# Patient Record
(demographics unavailable — no encounter records)

---

## 2024-10-23 NOTE — PHYSICAL EXAM
[Alert] : alert [Normal Voice/Communication] : normal voice/communication [Healthy Appearing] : healthy appearing [No Acute Distress] : no acute distress [Sclera] : the sclera and conjunctiva were normal [Hearing Threshold Finger Rub Not [place]] : hearing was normal [Normal Lips/Gums] : the lips and gums were normal [Oropharynx] : the oropharynx was normal [Normal Appearance] : the appearance of the neck was normal [No Neck Mass] : no neck mass was observed [No Respiratory Distress] : no respiratory distress [No Acc Muscle Use] : no accessory muscle use [Respiration, Rhythm And Depth] : normal respiratory rhythm and effort [Auscultation Breath Sounds / Voice Sounds] : lungs were clear to auscultation bilaterally [Heart Rate And Rhythm] : heart rate was normal and rhythm regular [Normal S1, S2] : normal S1 and S2 [Murmurs] : no murmurs [Bowel Sounds] : normal bowel sounds [Abdomen Tenderness] : non-tender [No Masses] : no abdominal mass palpated [Abdomen Soft] : soft [] : no hepatosplenomegaly [Oriented To Time, Place, And Person] : oriented to person, place, and time

## 2024-10-23 NOTE — ASSESSMENT
[FreeTextEntry1] : Lost 5 lbs since august, 8 lbs since June. Down 10 lbs since March;  CT chest abd pelvis from this year essentially normal. Previously under the care of Nicole Barth MD with 1 cm hiatal hernia and shatzki ring. last colonoscopy approx 10 years ago with Dr. RD Morrell. Pt had 2 ANIL 05/24. States he is eating less carbs since his cath and thinks this may be the cause of his weight. CBC, cmp normal.  IMP: 1. Weight loss, with recent weight gain-- ? voluntary- pt has been under stress with ill wife 2. Hx of shatzki ring, hh 3. CAD  PLAN: 1 pt encouraged to increase intake 2. RTO 4 -6 weeks for reevaluation 3. if continued weight loss, will consider colonoscopy, although doubt would change mgmt

## 2024-10-23 NOTE — HISTORY OF PRESENT ILLNESS
[FreeTextEntry1] : Lost 5 lbs since august, 8 lbs since June. Down 10 lbs since March;  CT chest abd pelvis from this year essentially normal. Previously under the care of Nicole Barth MD with 1 cm hiatal hernia and shatzki ring. last colonoscopy approx 10 years ago with Dr. RD Morrell. Pt had 2 ANIL 05/24. States he is eating less carbs since his cath and thinks this may be the cause of his weight. CBC, cmp normal.

## 2024-11-20 NOTE — PHYSICAL EXAM

## 2024-11-20 NOTE — HISTORY OF PRESENT ILLNESS
[FreeTextEntry1] : This is an 83 y/o male with a pmhx of CAD s/p stent to RCA X 2 in 05/2024, HLD, HTN, AS, esophageal dysmotility here today for follow up. Patient was seen by Dr. Dee for KURTZ. s/p normal ddimer, normal PFT's No obvious pulmonary causes found for his exertional dyspnea. Patient states that KURTZ has resolved at this time. Patient states that he is feeling well today with no complaints   Denies chest pain, palpitations, diaphoresis, vision changes, HA, dizziness, syncope, cough, wheezing, edema, fever, chills, infection.

## 2025-02-13 NOTE — HISTORY OF PRESENT ILLNESS
[de-identified] : He has symptoms for three days- cough which is mostly dry, chest pain and throat pain with cough. No sinus pain, ear pain, dyspnea, fever.  No OTC meds.  His granddaughter has strep throat.  COVID-19 testing negative X2.  He hasn't had the flu vaccine or current COVID-19 vaccine.

## 2025-02-13 NOTE — ASSESSMENT
[FreeTextEntry1] : URI, possible early bronchitis.  Will treat with a Z-jona.  OTC meds discussed.  Rapid strep negative (had been exposed).  Will check a nasal swab for COVID-19, influenza and RSV.

## 2025-02-28 NOTE — PHYSICAL EXAM
[FreeTextEntry3] : PE:  General: well-appearing, alert, in no acute distress  Full body skin exam performed examining scalp, head, face, ears, eyes, mouth, neck, chest, back, abdomen, axilla, buttock, b/l arms, b/l forearms, b/l hands, b/l fingernails, b/l thighs, b/l legs, b/l feet, b/l toenails. Groin and genitalia deferred per patient. Pertinent findings include: - scattered gritty papules on the face, scalp, arms - On left medial frontal scalp there is a ~1cm pearly papule with central ulceration - On the left lateral frontal scalp there is a pink pearly papule with central crusting  - chest with pink papule  - fairly uniform and regular brown macules and papules on the trunk and extremities. dermoscopy with benign features - cherry red papules scattered on torso

## 2025-02-28 NOTE — ASSESSMENT
[FreeTextEntry1] :  #Neoplasm of Uncertain Behavior x 3 Site A: Left medial frontal scalp 11.5cm to left medial eyebrow and 16cm to anterior helix Ddx: BCC  Site B: Left lateral frontal scalp 9.7cm to left medial eyebrow 12.5cm to anterior helix Ddx: BCC  Site C: central chest Ddx: BCC vs. seb derm/eczematous derm   Shave Biopsy x3 The indication, benefits, alternatives, and all side effects including pain, bleeding, infection, scar, recurrence, nerve damage were discussed. Informed consent was obtained in writing. The lesions on the above locations were prepped with alcohol and locally anesthetized with 0.1mL of 1% lidocaine with epinephrine. The specimen was removed by tangential shave using a Dermablade. Hemostasis was achieved with pressure and aluminum chloride/drysol. A sterile dressing with Petrolatum ointment was applied to the wound. Verbal and written wound care instructions were given. The specimens were labeled and submitted to pathology for histological evaluation. The patient will be notified of their biopsy results within the next two weeks and appropriate treatment and follow-up recommendations will be made at that time. The procedure was well tolerated without complications..  #Seborrheic keratoses  #Lentigines  #Cherry angioma  #Multiple melanocytic nevi, benign - education, counseling, reassurance - ABCDEs of melanoma reviewed, rtc sooner if changing  #History of multiple basal cell carcinoma  - 3 bx today as below - Recommend follow up in high risk skin cancer clinic - Recommend q6 month skin checks at least  # actinic keratosis - education, counseling - after discussion of r/b/a, verbal consent obtained and treated with cryotherapy as below: --Cryotherapy performed: Benefits/Risks/adverse effects discussed: erythema, blistering, dyspigmentation (hypo/hyper), scar, need for multiple treatment, persistence/recurrence. Lesion number: 10 #freeze-thaw cycles to each lesion: 2 Thaw time: 5s Wound care discussed  FBSE/Healthcare maintenance -Sun protection was discussed. The proper use of broad-spectrum UVA/UVB sunscreens with SPF 30 or greater was reviewed and the need for re-application after swimming or sweating or 2-3 hours was emphasized. We talked about judicious use of clothing and avoidance of peak periods of sun exposure. I made the patient aware of the need for year-round protection. ABCDEs of melanoma were also reviewed. -Self-skin checks were also reviewed, rtc sooner if changed noted -Counseled patient to monitor for changes - FBSE completed today, no lesions concerning for malignancy. Next FBSE due 6 months  RTC PRN path/ 6 months for FBSE

## 2025-02-28 NOTE — HISTORY OF PRESENT ILLNESS
[FreeTextEntry1] : NPV: fbse [de-identified] : BRANDY LANG is a 84-year-old who is presenting for evaluation of:   1. FBSE- has history of >12 BCCs s/p Mohs, ED&C, efudex (most recent 5 years ago). Last BCC was last year. Previously followed at [facility].   Personal skin cancer hx: > 12 BCCs - scalp, face, back, hand s/p Mohs, ED&C, efudex

## 2025-03-28 NOTE — HEALTH RISK ASSESSMENT
[No falls in past year] : Patient reported no falls in the past year [0] : 1) Little interest or pleasure doing things: Not at all (0) [Patient/Caregiver not ready to engage] : , patient/caregiver not ready to engage [[id number]] : 0 [Change in mental status noted] : No change in mental status noted [Language] : denies difficulty with language [Behavior] : denies difficulty with behavior [Learning/Retaining New Information] : denies difficulty learning/retaining new information [Handling Complex Tasks] : denies difficulty handling complex tasks [Reasoning] : denies difficulty with reasoning [Spatial Ability and Orientation] : denies difficulty with spatial ability and orientation

## 2025-03-28 NOTE — HISTORY OF PRESENT ILLNESS
[FreeTextEntry1] : The patient is here for a routine visit.  [de-identified] : He is active and walks on the treadmill.  No chest pain or dyspnea.   Diet is healthy.  Appetite now good and weight is stable. No abdominal pain.  He has chronic esophageal symptoms.

## 2025-03-28 NOTE — ASSESSMENT
[FreeTextEntry1] : Medical events reviewed.  He had symptoms last spring and had a cardiac cath with the placement of two coronary stents.  He then had a second cath for persistent symptoms and he says that was patent.  He sees his cardiologist, Dr. Fraire.  He has no symptoms now and he feels well.  He will see his cardiologist next week.  Discussed diet and exercise.  Check lipids on Repatha.  Check a HgBA1c which was 5.9.  The BP is good at 125/60.  Note he is on Amlodipine.  He has skin cancer and he sees his dermatologist.  Shingrix advised and declined.  S/p Prevnar 20.  He had the original COVID-19 vaccine and was hospitalized with a severe reaction so he defers the current vaccine.  Esophageal symptoms chronic.  Weight is stable and appetite good.  He had previous weight loss last year and had a negative evaluation including CT A/P. He sees a gastroenterologist.    He saw a pulmonologist last year for dyspnea.  Symptoms resolved.  He sees an ophthalmologist.

## 2025-03-28 NOTE — HEALTH RISK ASSESSMENT
[No falls in past year] : Patient reported no falls in the past year [0] : 1) Little interest or pleasure doing things: Not at all (0) [Patient/Caregiver not ready to engage] : , patient/caregiver not ready to engage [[id number]] : 0 [Change in mental status noted] : No change in mental status noted [Language] : denies difficulty with language [Behavior] : denies difficulty with behavior [Learning/Retaining New Information] : denies difficulty learning/retaining new information [Handling Complex Tasks] : denies difficulty handling complex tasks [Reasoning] : denies difficulty with reasoning [Spatial Ability and Orientation] : denies difficulty with spatial ability and orientation

## 2025-03-28 NOTE — HISTORY OF PRESENT ILLNESS
[FreeTextEntry1] : The patient is here for a routine visit.  [de-identified] : He is active and walks on the treadmill.  No chest pain or dyspnea.   Diet is healthy.  Appetite now good and weight is stable. No abdominal pain.  He has chronic esophageal symptoms.

## 2025-04-02 NOTE — HISTORY OF PRESENT ILLNESS
[FreeTextEntry1] : This is an 85 y/o male with a pmhx of CAD s/p stent to RCA X 2 in 05/2024, HLD, HTN, AS, esophageal dysmotility here today for follow up. Patient s/p recent ED visit with chills, body tremors, chest pain, SOB.  Patient was noted to be hypotensive and labs with leukocytosis. wbc 22. CT chest AP w/ no identified source of infection UA negative , RBVP negative , s/p 2L IV fluid bolus w/ mild improvement in BP , s/p Ceftriaxone. No identifiable source of infection. Blood culture, NTD. WBC count, now wnl. ekg NSR w/o acute changes, trop neg x 2. tte with normal biventricular function and mild AS. Had SOB mostly with exertion, CXR and CT chest w/ no findings , o2 sat 95% on ra , no respiratory distress , chest pain not pleuritic. CTA neg for PE. On tele with sinus bradycardia, HR Increased to 77 walking slow pace around the hallway, suggesting likely chronotropic competence. Patient continues to report weakness. He reports chest pain and SOB. has resolved. He admits to lightheadedness, he denies palpitations or syncope. He had nausea which resolved.

## 2025-04-02 NOTE — PHYSICAL EXAM
[Well Developed] : well developed [Well Nourished] : well nourished [No Acute Distress] : no acute distress [Normal Conjunctiva] : normal conjunctiva [Normal Venous Pressure] : normal venous pressure [No Carotid Bruit] : no carotid bruit [Normal S1, S2] : normal S1, S2 [No Murmur] : no murmur [No Rub] : no rub [No Gallop] : no gallop [Clear Lung Fields] : clear lung fields [Good Air Entry] : good air entry [No Respiratory Distress] : no respiratory distress  [Soft] : abdomen soft [Non Tender] : non-tender [No Masses/organomegaly] : no masses/organomegaly [Normal Bowel Sounds] : normal bowel sounds [Normal Gait] : normal gait [No Edema] : no edema [No Cyanosis] : no cyanosis [No Clubbing] : no clubbing [No Varicosities] : no varicosities [No Rash] : no rash [No Skin Lesions] : no skin lesions [Moves all extremities] : moves all extremities [No Focal Deficits] : no focal deficits [Normal Speech] : normal speech [Alert and Oriented] : alert and oriented [Normal memory] : normal memory [de-identified] : lump left arm  [de-identified] : skin excoriation scalp

## 2025-04-11 NOTE — ASSESSMENT
[FreeTextEntry1] : # sBCC on central chest - Treated today with EDC - risks and benefits discussed including incomplete removal,risk of recurrence and infection, informed consent obtained, confirmed site based on prior photos, pictures taken. Previously discussed options including EDC vs excision, r/b/a, patient opted for EDC.  Electrodesiccation and Curettage:  Pacemaker? NO Location: central chest  Post-operative Size (cm):  A. 1.3 cm x 1.1 cm Anesthesia (1% lidocaine with epinephrine) volume (cc's): 2cc I explained the diagnosis to the patient and recommend destruction of the lesion. I explained all treatment options and risks of the procedure to the patient and obtained consent.  Side effects/risks discussed include scar, bleeding, infection, pain, incomplete removal, recurrence, nerve damage, allergic reaction to lidocaine. The lesion was antiseptically prepared. It was then curetted and electrodesiccated 3 times with a curette until there was no more apparent tumor.  The wound was cleaned and dried, vaseline was applied and the wound covered with a pressure dressing.  The patient was given detailed oral and written instructions on post-operative care.  The patient tolerated the procedure well without any complications.  # Scalp SCCx2 Left medial frontal scalp Left lateral frontal scalp  We discussed in depth the importance of following up with Dr. Alvarenga for consultation about the two SCCs on his scalp. PT is apprehensive about surgery and having bleeding complications after. He would prefer less invasive options but is open to speaking with Dr. Alvarenga about the options including surgery. He also mentioned today that he had a history of radiation treatment for a skin cancer on the frontal scalp (patient pointed to area adjacent to one of the bx sites + for SCC). We emphasized the risk of morbidity and mortality with untreated SCCs. He understood and agreed with plan to see Dr. Alvarenga.

## 2025-04-11 NOTE — HISTORY OF PRESENT ILLNESS
[FreeTextEntry1] : fu- EDC central chest sBCC [de-identified] : BRANDY LANG is a 84-year-old who is presenting for EDC of central chest sBCC.   of note, pt is pending tx for two scalp SCC with bx report as below. He has a consultation with Dr. Alvarenga April 23rd.   1.  Left medial frontal scalp,   shave biopsy:      - Squamous-cell carcinoma, at least in situ, broadly transected at the base, see note. Note:  Atypical keratinocytes extend to the base of the specimen where the possibility of a component of squamous cell carcinoma invading into the underlying dermis cannot be excluded.  2.  Left lateral frontal scalp,   shave biopsy:      - Crateriform squamous-cell carcinoma, well differentiated,     surface of lesion,  see note. Note: Background hyperplastic actinic keratosis is also present in these sections.  Personal skin cancer hx: > 12 BCCs - scalp, face, back, hand s/p Mohs, ED&C, efudex has history of >12 BCCs s/p Mohs, ED&C, efudex (most recent 5 years ago). Last BCC was last year. Previously followed at [facility].

## 2025-04-11 NOTE — PHYSICAL EXAM
[FreeTextEntry3] :  AAOx3, NAD, well-appearing / pleasant Focused body exam only (see below) per patient request: central chest with 1.0cmx 0.8cm pink macule

## 2025-04-11 NOTE — HISTORY OF PRESENT ILLNESS
[FreeTextEntry1] : fu- EDC central chest sBCC [de-identified] : BRANDY LANG is a 84-year-old who is presenting for EDC of central chest sBCC.   of note, pt is pending tx for two scalp SCC with bx report as below. He has a consultation with Dr. Alvarenga April 23rd.   1.  Left medial frontal scalp,   shave biopsy:      - Squamous-cell carcinoma, at least in situ, broadly transected at the base, see note. Note:  Atypical keratinocytes extend to the base of the specimen where the possibility of a component of squamous cell carcinoma invading into the underlying dermis cannot be excluded.  2.  Left lateral frontal scalp,   shave biopsy:      - Crateriform squamous-cell carcinoma, well differentiated,     surface of lesion,  see note. Note: Background hyperplastic actinic keratosis is also present in these sections.  Personal skin cancer hx: > 12 BCCs - scalp, face, back, hand s/p Mohs, ED&C, efudex has history of >12 BCCs s/p Mohs, ED&C, efudex (most recent 5 years ago). Last BCC was last year. Previously followed at [facility].

## 2025-04-29 NOTE — HISTORY OF PRESENT ILLNESS
[FreeTextEntry1] : Mohs consultation  [de-identified] : 04/23/2025    Referred by: Dr. Allen  We had the pleasure of seeing your patient in consultation for Mohs Micrographic Surgery.    Mr. 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 These lesions were biopsied 2/28/2025. 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 Salvador in Dec 2020. He is hesitant to undergo additional surgery due to difficulties with Mohs surgeries in the past (described extensive swelling and bruising). Prior Mohs procedures were done at [facility] in [place] and he underwent radiation to his frontal scalp bilaterally with Dr. Salvador in ~2018.   Skin cancer history:  As above, reports at least 12 BCCs which were treated wigh Mohs, ED&C, efudex, radiation PMH:  CAD s/p stent 5/2024, HTN SH: Lives with his wife in [place]. 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  Upcoming travel plans: none   Pertinent positives noted below   History of immunosuppression, radiation, or cancer: Radiation to scalp in 2018 for BCC No History of HIV or hepatitis Blood thinners: ASA and plavix  Antibiotic Prophylaxis: None   Medical implants: None    The patient's review of systems questionnaire was reviewed.   Education needs were identified. There were no barriers to learning.

## 2025-04-29 NOTE — HISTORY OF PRESENT ILLNESS
[FreeTextEntry1] : Mohs consultation  [de-identified] : 04/23/2025    Referred by: Dr. Allen  We had the pleasure of seeing your patient in consultation for Mohs Micrographic Surgery.    Mr. 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 These lesions were biopsied 2/28/2025. 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 Salvador in Dec 2020. He is hesitant to undergo additional surgery due to difficulties with Mohs surgeries in the past (described extensive swelling and bruising). Prior Mohs procedures were done at [facility] in [place] and he underwent radiation to his frontal scalp bilaterally with Dr. Salvador in ~2018.   Skin cancer history:  As above, reports at least 12 BCCs which were treated wigh Mohs, ED&C, efudex, radiation PMH:  CAD s/p stent 5/2024, HTN SH: Lives with his wife in [place]. 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  Upcoming travel plans: none   Pertinent positives noted below   History of immunosuppression, radiation, or cancer: Radiation to scalp in 2018 for BCC No History of HIV or hepatitis Blood thinners: ASA and plavix  Antibiotic Prophylaxis: None   Medical implants: None    The patient's review of systems questionnaire was reviewed.   Education needs were identified. There were no barriers to learning.

## 2025-04-29 NOTE — ASSESSMENT
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  I recommended Mohs surgery due to the tumor type, location, ill-defined nature of cancer, and former radiation at the site. I explained that in his case, it is unclear if this is a new primary or a recurrence of prior cancer and Mohs will give the greatest level of certainty about whether the cancer is gone. I explained that following extirpation there will be a full thickness defect of the involved area. The reconstructive options will be based on the defect size and surrounding tissue laxity of the involved area. Primary closure is only possible for smaller defects. For larger defects, local tissue rearrangement or skin grafting may be necessary. Risks following layered primary closure or local tissue rearrangement include wound dehiscence, contour irregularity, bleeding, infection, and paresthesia (nerve damage including sensory deficit or motor weakness). Risks following skin grafting include wound dehiscence, skin graft nonadherence (partial or complete), contour irregularity, bleeding, infection, paresthesia, and donor site complications.   I explained that the patient will need to abstain from physical activity for 1-2 weeks following the surgery, that they would heal with a scar, and also discussed the chances of infection, bleeding, potential sensory or motor nerve damage, and recurrence.  The patient indicated that he understood the risks.   -- In particular, for reconstruction we discussed healing by secondary intent or with a graft or linear repair  We discussed treating the larger crateriform SCC first and deferring the in situ lesion for after, if necessary. The patient would like to discuss the treatment options with his family and will call our office to schedule the surgery.  -- I strongly urged him to consider surgical treatment. I explained the possibility this could extend into the bone and if not treated can metastasize or erode into the bone. I explained that if the tumor extends into bone he'd need to have further intervention possibly in the OR.  -- I have requested our coordinator give him a call next week to discuss scheduling, but if he declines he will need to follow up with Dr. Allen    Thank you for this Mohs surgery referral. We recommended that Mr. BRANDY LANG follow up with His referring dermatologist for routine skin exams    Sarai Alvarenga MD, [facility]   of Dermatology  Director of Dermatologic Surgery [facility]

## 2025-04-29 NOTE — PHYSICAL EXAM
[Alert] : alert [Well Nourished] : well nourished [Conjunctiva Non-injected] : conjunctiva non-injected [No Visual Lymphadenopathy] : no visual  lymphadenopathy [No Clubbing] : no clubbing [No Edema] : no edema [No Bromhidrosis] : no bromhidrosis [No Chromhidrosis] : no chromhidrosis [FreeTextEntry3] : left medial frontal scalp with a 1x 1 cm pink raised nodule with central excoriation with adjacent pink plaque with overlying crust- total a 2 x 1cm area   left lateral frontal scalp with a 1 x 0.9cm pink macule   atrophic hypopigmented patch centrally   No palpable head and neck lymphadenopathy

## 2025-04-29 NOTE — HISTORY OF PRESENT ILLNESS
[FreeTextEntry1] : Mohs consultation  [de-identified] : 04/23/2025    Referred by: Dr. Allen  We had the pleasure of seeing your patient in consultation for Mohs Micrographic Surgery.    Mr. 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 These lesions were biopsied 2/28/2025. 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 Salvador in Dec 2020. He is hesitant to undergo additional surgery due to difficulties with Mohs surgeries in the past (described extensive swelling and bruising). Prior Mohs procedures were done at [facility] in [place] and he underwent radiation to his frontal scalp bilaterally with Dr. Salvador in ~2018.   Skin cancer history:  As above, reports at least 12 BCCs which were treated wigh Mohs, ED&C, efudex, radiation PMH:  CAD s/p stent 5/2024, HTN SH: Lives with his wife in [place]. 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  Upcoming travel plans: none   Pertinent positives noted below   History of immunosuppression, radiation, or cancer: Radiation to scalp in 2018 for BCC No History of HIV or hepatitis Blood thinners: ASA and plavix  Antibiotic Prophylaxis: None   Medical implants: None    The patient's review of systems questionnaire was reviewed.   Education needs were identified. There were no barriers to learning.

## 2025-04-29 NOTE — ASSESSMENT
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  I recommended Mohs surgery due to the tumor type, location, ill-defined nature of cancer, and former radiation at the site. I explained that in his case, it is unclear if this is a new primary or a recurrence of prior cancer and Mohs will give the greatest level of certainty about whether the cancer is gone. I explained that following extirpation there will be a full thickness defect of the involved area. The reconstructive options will be based on the defect size and surrounding tissue laxity of the involved area. Primary closure is only possible for smaller defects. For larger defects, local tissue rearrangement or skin grafting may be necessary. Risks following layered primary closure or local tissue rearrangement include wound dehiscence, contour irregularity, bleeding, infection, and paresthesia (nerve damage including sensory deficit or motor weakness). Risks following skin grafting include wound dehiscence, skin graft nonadherence (partial or complete), contour irregularity, bleeding, infection, paresthesia, and donor site complications.   I explained that the patient will need to abstain from physical activity for 1-2 weeks following the surgery, that they would heal with a scar, and also discussed the chances of infection, bleeding, potential sensory or motor nerve damage, and recurrence.  The patient indicated that he understood the risks.   -- In particular, for reconstruction we discussed healing by secondary intent or with a graft or linear repair  We discussed treating the larger crateriform SCC first and deferring the in situ lesion for after, if necessary. The patient would like to discuss the treatment options with his family and will call our office to schedule the surgery.  -- I strongly urged him to consider surgical treatment. I explained the possibility this could extend into the bone and if not treated can metastasize or erode into the bone. I explained that if the tumor extends into bone he'd need to have further intervention possibly in the OR.  -- I have requested our coordinator give him a call next week to discuss scheduling, but if he declines he will need to follow up with Dr. Allen    Thank you for this Mohs surgery referral. We recommended that Mr. BRANDY LANG follow up with His referring dermatologist for routine skin exams    Sarai Alvarenga MD, [facility]   of Dermatology  Director of Dermatologic Surgery [facility]

## 2025-04-29 NOTE — ASSESSMENT
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  I recommended Mohs surgery due to the tumor type, location, ill-defined nature of cancer, and former radiation at the site. I explained that in his case, it is unclear if this is a new primary or a recurrence of prior cancer and Mohs will give the greatest level of certainty about whether the cancer is gone. I explained that following extirpation there will be a full thickness defect of the involved area. The reconstructive options will be based on the defect size and surrounding tissue laxity of the involved area. Primary closure is only possible for smaller defects. For larger defects, local tissue rearrangement or skin grafting may be necessary. Risks following layered primary closure or local tissue rearrangement include wound dehiscence, contour irregularity, bleeding, infection, and paresthesia (nerve damage including sensory deficit or motor weakness). Risks following skin grafting include wound dehiscence, skin graft nonadherence (partial or complete), contour irregularity, bleeding, infection, paresthesia, and donor site complications.   I explained that the patient will need to abstain from physical activity for 1-2 weeks following the surgery, that they would heal with a scar, and also discussed the chances of infection, bleeding, potential sensory or motor nerve damage, and recurrence.  The patient indicated that he understood the risks.   -- In particular, for reconstruction we discussed healing by secondary intent or with a graft or linear repair  We discussed treating the larger crateriform SCC first and deferring the in situ lesion for after, if necessary. The patient would like to discuss the treatment options with his family and will call our office to schedule the surgery.  -- I strongly urged him to consider surgical treatment. I explained the possibility this could extend into the bone and if not treated can metastasize or erode into the bone. I explained that if the tumor extends into bone he'd need to have further intervention possibly in the OR.  -- I have requested our coordinator give him a call next week to discuss scheduling, but if he declines he will need to follow up with Dr. Allen    Thank you for this Mohs surgery referral. We recommended that Mr. BRANDY LANG follow up with His referring dermatologist for routine skin exams    Sarai Alvarenga MD, [facility]   of Dermatology  Director of Dermatologic Surgery [facility]

## 2025-05-30 NOTE — HISTORY OF PRESENT ILLNESS
[FreeTextEntry1] : Mohs surgery of crateriform SCC of the left MEDIAL frontal scalp [de-identified] : 05/30/2025  Referred by: Dr. Allen  We had the pleasure of seeing your patient for Mohs Micrographic Surgery.    Mr. BRANDY LANG is a 84 year old M who presents for Mohs Micrographic Surgery of SCCis of the left MEDIAL frontal scalp. Consult done 4/23/2025. Pending SCC of the left lateral frontal scalp.  Hx: These lesions were biopsied 2/28/2025. 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 Salvador in Dec 2020. He is hesitant to undergo additional surgery due to difficulties with Mohs surgeries in the past (described extensive swelling and bruising). Prior Mohs procedures were done at [facility] in [place] and he underwent radiation to his frontal scalp bilaterally with Dr. Salvador in ~2018.   Skin cancer history:  As above, reports at least 12 BCCs which were treated [facility] Mohs, ED&C, efudex, radiation PMH:  CAD s/p stent 5/2024, HTN SH: Lives with his wife in [place]. 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  Upcoming travel plans: none   Pertinent positives noted below   History of immunosuppression, radiation, or cancer: Radiation to scalp in 2018 for BCC No History of HIV or hepatitis Blood thinners: ASA and plavix  Antibiotic Prophylaxis: None   Medical implants: None    The patient's review of systems questionnaire was reviewed.   Education needs were identified. There were no barriers to learning.

## 2025-05-30 NOTE — HISTORY OF PRESENT ILLNESS
[FreeTextEntry1] : Mohs surgery of crateriform SCC of the left MEDIAL frontal scalp [de-identified] : 05/30/2025  Referred by: Dr. Allen  We had the pleasure of seeing your patient for Mohs Micrographic Surgery.    Mr. BRANDY LANG is a 84 year old M who presents for Mohs Micrographic Surgery of SCCis of the left MEDIAL frontal scalp. Consult done 4/23/2025. Pending SCC of the left lateral frontal scalp.  Hx: These lesions were biopsied 2/28/2025. 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 Salvador in Dec 2020. He is hesitant to undergo additional surgery due to difficulties with Mohs surgeries in the past (described extensive swelling and bruising). Prior Mohs procedures were done at [facility] in [place] and he underwent radiation to his frontal scalp bilaterally with Dr. Salvador in ~2018.   Skin cancer history:  As above, reports at least 12 BCCs which were treated [facility] Mohs, ED&C, efudex, radiation PMH:  CAD s/p stent 5/2024, HTN SH: Lives with his wife in [place]. 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  Upcoming travel plans: none   Pertinent positives noted below   History of immunosuppression, radiation, or cancer: Radiation to scalp in 2018 for BCC No History of HIV or hepatitis Blood thinners: ASA and plavix  Antibiotic Prophylaxis: None   Medical implants: None    The patient's review of systems questionnaire was reviewed.   Education needs were identified. There were no barriers to learning.

## 2025-05-30 NOTE — ASSESSMENT
[FreeTextEntry1] : Mohs surgery for SCCis of left medial frontal scalp 05/30/2025  PENDING treatment of SCC of left lateral frontal scalp   -- Mohs 05/30/2025 for left MEDIAL frontal scalp - residual SCCis/bowenoid AK present on Mohs sections - clinically this corresponds to an entire area of intermittent pink and grittiness on the posterior right scalp -- I have advised that he allow this area to heal and follow up with his dermatologist for consideration of Efudex field therapy to the entire scalp This was discussed with him f/u for wound check in 6 weeks  Can do Mohs for the Left LATERAL SCC at that time

## 2025-05-30 NOTE — PHYSICAL EXAM
[Alert] : alert [Well Nourished] : well nourished [Conjunctiva Non-injected] : conjunctiva non-injected [No Visual Lymphadenopathy] : no visual  lymphadenopathy [No Clubbing] : no clubbing [No Edema] : no edema [No Bromhidrosis] : no bromhidrosis [No Chromhidrosis] : no chromhidrosis [R Arm] : R Arm [FreeTextEntry3] : left lateral frontal scalp with a 1 x 0.9cm pink macule   atrophic hypopigmented patch centrally   No palpable head and neck lymphadenopathy  left MEDIAL frontal scalp with a 1x 1 cm pink raised nodule with central excoriation with adjacent pink plaque with overlying crust- total a 2 x 1cm area   Gritty pink papules on scalp

## 2025-06-13 NOTE — HISTORY OF PRESENT ILLNESS
[FreeTextEntry1] : wound check [de-identified] : s/p Mohs surgery of crateriform SCC of the left MEDIAL frontal scalp 05/30/2025 Was doing well until 3 days ago, starting hurting in the anterior post aspect worst yesterday (day 2) but states same today, not worsening been using vaseline since op day No fevers, increased redness, drainage although it is hard to see the area

## 2025-06-13 NOTE — PHYSICAL EXAM
[FreeTextEntry3] : Wound L medial frontal scalp granulation tissue noted mildly tender focally on anterior-most aspect without pus, drainage. mild < 2 mm area of erythema on edge